# Patient Record
Sex: FEMALE | Race: WHITE
[De-identification: names, ages, dates, MRNs, and addresses within clinical notes are randomized per-mention and may not be internally consistent; named-entity substitution may affect disease eponyms.]

---
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
## 2024-11-29 ENCOUNTER — OFFICE VISIT (OUTPATIENT)
Dept: URGENT CARE | Facility: PHYSICIAN GROUP | Age: 24
End: 2024-11-29
Payer: COMMERCIAL

## 2024-11-29 VITALS
SYSTOLIC BLOOD PRESSURE: 114 MMHG | BODY MASS INDEX: 25.94 KG/M2 | TEMPERATURE: 97.9 F | HEIGHT: 62 IN | OXYGEN SATURATION: 99 % | DIASTOLIC BLOOD PRESSURE: 72 MMHG | RESPIRATION RATE: 18 BRPM | WEIGHT: 140.98 LBS | HEART RATE: 92 BPM

## 2024-11-29 DIAGNOSIS — J01.40 ACUTE NON-RECURRENT PANSINUSITIS: ICD-10-CM

## 2024-11-29 DIAGNOSIS — H10.33 ACUTE CONJUNCTIVITIS OF BOTH EYES, UNSPECIFIED ACUTE CONJUNCTIVITIS TYPE: ICD-10-CM

## 2024-11-29 RX ORDER — MOXIFLOXACIN 5 MG/ML
1 SOLUTION/ DROPS OPHTHALMIC 3 TIMES DAILY
Qty: 2 ML | Refills: 0 | Status: SHIPPED | OUTPATIENT
Start: 2024-11-29 | End: 2024-12-06

## 2024-11-29 RX ORDER — ERYTHROMYCIN 5 MG/G
1 OINTMENT OPHTHALMIC
Qty: 1 G | Refills: 0 | Status: SHIPPED | OUTPATIENT
Start: 2024-11-29 | End: 2024-12-06

## 2024-11-29 RX ORDER — AMOXICILLIN 875 MG/1
875 TABLET, COATED ORAL 2 TIMES DAILY
Qty: 14 TABLET | Refills: 0 | Status: SHIPPED | OUTPATIENT
Start: 2024-11-29 | End: 2024-12-06

## 2024-11-29 ASSESSMENT — ENCOUNTER SYMPTOMS
SWEATS: 0
FEVER: 0
COUGH: 1
VOMITING: 0
CARDIOVASCULAR NEGATIVE: 1
SORE THROAT: 1
HEMOPTYSIS: 0
MYALGIAS: 0
WEIGHT LOSS: 0
SHORTNESS OF BREATH: 0
BACK PAIN: 0
EYE DISCHARGE: 1
EYE REDNESS: 1
NAUSEA: 0
CHILLS: 0
SPUTUM PRODUCTION: 1
WHEEZING: 0
HEADACHES: 1
RHINORRHEA: 1
CONSTITUTIONAL NEGATIVE: 1
HEARTBURN: 0
SINUS PAIN: 1

## 2024-11-29 ASSESSMENT — COPD QUESTIONNAIRES: COPD: 0

## 2024-11-30 NOTE — PROGRESS NOTES
Subjective:   Giorgi Calderon is a 24 y.o. female who presents for Cough (Started 11/9/24 cough ,sore throat ,eye discharge , mucus >brown green , left ear feels pressure )      Patient presents with complaints of persistent cough, sore throat, sinus pain, and new left ear pain.  States that her cough and congestion have been going on for approximately 20 days, thought that she was getting better trying to manage symptoms at home and with watchful waiting though now she has had a recurrence of sore throat congestion new onset of ear pain as well as production to her cough.  Patient states this morning she woke up with bilateral conjunctival discharge, redness swelling.  Denies any fever, chills, body aches no visual changes, no shortness of breath, chest pain no stridor    Cough  This is a new problem. The current episode started 1 to 4 weeks ago. The problem has been waxing and waning. The problem occurs every few minutes. The cough is Productive of sputum. Associated symptoms include ear congestion, eye redness, headaches, nasal congestion, postnasal drip, rhinorrhea and a sore throat. Pertinent negatives include no chest pain, chills, ear pain, fever, heartburn, hemoptysis, myalgias, rash, shortness of breath, sweats, weight loss or wheezing. She has tried nothing for the symptoms. There is no history of asthma, bronchiectasis, bronchitis, COPD, emphysema, environmental allergies or pneumonia.       Review of Systems   Constitutional: Negative.  Negative for chills, fever and weight loss.   HENT:  Positive for congestion, postnasal drip, rhinorrhea, sinus pain and sore throat. Negative for ear discharge and ear pain.    Eyes:  Positive for discharge and redness.   Respiratory:  Positive for cough and sputum production. Negative for hemoptysis, shortness of breath and wheezing.    Cardiovascular: Negative.  Negative for chest pain.   Gastrointestinal:  Negative for heartburn, nausea and vomiting.   Genitourinary:  "Negative.    Musculoskeletal:  Negative for back pain and myalgias.   Skin: Negative.  Negative for rash.   Neurological:  Positive for headaches.   Endo/Heme/Allergies:  Negative for environmental allergies.     Refer to [facility] for additional details.    During this visit, appropriate PPE was worn, and hand hygiene was performed.    PMH:  has no past medical history on file.    MEDS:   Current Outpatient Medications:     norgestrel-ethinyl estradiol (LO-OVRAL) 0.3-30 MG-MCG Tab, Take 1 Tablet by mouth every day., Disp: , Rfl:     amoxicillin (AMOXIL) 875 MG tablet, Take 1 Tablet by mouth 2 times a day for 7 days., Disp: 14 Tablet, Rfl: 0    erythromycin 5 MG/GM Ointment, Apply 1 Application to both eyes at bedtime for 7 days., Disp: 1 g, Rfl: 0    moxifloxacin (VIGAMOX) 0.5 % Solution, Administer 1 Drop into both eyes 3 times a day for 7 days., Disp: 2 mL, Rfl: 0    ALLERGIES: No Known Allergies  SURGHX: History reviewed. No pertinent surgical history.  SOCHX:  reports that she has never smoked. She has never used smokeless tobacco. She reports current alcohol use. She reports that she does not use drugs.    FH: Per HPI as applicable/pertinent.    Medications, Allergies, and current problem list reviewed today in Epic.     Objective:     /72   Pulse 92   Temp 36.6 °C (97.9 °F) (Temporal)   Resp 18   Ht 1.575 m (5' 2\")   Wt 64 kg (140 lb 15.8 oz)   SpO2 99%     Physical Exam  Vitals reviewed.   Constitutional:       General: She is not in acute distress.     Appearance: She is normal weight. She is not ill-appearing.   HENT:      Head: Normocephalic and atraumatic.      Right Ear: Tympanic membrane, ear canal and external ear normal.      Left Ear: Tympanic membrane, ear canal and external ear normal.      Nose: No congestion or rhinorrhea.      Right Sinus: Maxillary sinus tenderness and frontal sinus tenderness present.      Left Sinus: Maxillary sinus tenderness and frontal sinus tenderness present.      " Mouth/Throat:      Mouth: Mucous membranes are moist.      Pharynx: No oropharyngeal exudate or posterior oropharyngeal erythema.   Eyes:      General:         Right eye: Discharge present.         Left eye: Discharge present.     Pupils: Pupils are equal, round, and reactive to light.   Cardiovascular:      Rate and Rhythm: Normal rate.   Pulmonary:      Effort: Pulmonary effort is normal. No respiratory distress.      Breath sounds: No stridor. No wheezing, rhonchi or rales.   Chest:      Chest wall: No tenderness.   Musculoskeletal:      Cervical back: Normal range of motion. No rigidity or tenderness.   Lymphadenopathy:      Cervical: No cervical adenopathy.   Neurological:      Mental Status: She is alert.         Assessment/Plan:     Diagnosis and associated orders:     1. Acute non-recurrent pansinusitis  - amoxicillin (AMOXIL) 875 MG tablet; Take 1 Tablet by mouth 2 times a day for 7 days.  Dispense: 14 Tablet; Refill: 0    2. Acute conjunctivitis of both eyes, unspecified acute conjunctivitis type  - erythromycin 5 MG/GM Ointment; Apply 1 Application to both eyes at bedtime for 7 days.  Dispense: 1 g; Refill: 0  - moxifloxacin (VIGAMOX) 0.5 % Solution; Administer 1 Drop into both eyes 3 times a day for 7 days.  Dispense: 2 mL; Refill: 0    Other orders  - norgestrel-ethinyl estradiol (LO-OVRAL) 0.3-30 MG-MCG Tab; Take 1 Tablet by mouth every day.     Comments/MDM:     Patient history and physical exam are consistent with acute pansinusitis with concomitant new onset of bilateral conjunctival discharge.  High suspicion that patient had likely viral illness that has been persistent lingering causing new superimposed bacterial sinusitis.  Likely reason why patient also now has new onset what sounds like bacterial conjunctivitis.  Overall patient presents well, will treat empirically with amoxicillin for sinusitis infections as well as erythromycin ointment and moxifloxacin drops for conjunctivitis  Outpatient  management will consist of amoxicillin twice daily 7 days, erythromycin ointment at night followed by moxifloxacin during the day 3 times daily, Tylenol/ibuprofen, increase fluids, strict hand hygiene, clean glasses, phone or anything that comes in contact with eye  Follow up in 3-5 days if no improvement in symptoms           Differential diagnosis, natural history, supportive care, and indications for immediate follow-up discussed.    Advised the patient to follow-up with the primary care physician for recheck, reevaluation, and consideration of further management.    Please note that this dictation was created using voice recognition software. I have made a reasonable attempt to correct obvious errors, but I expect that there are errors of grammar and possibly content that I did not discover before finalizing the note.    This note was electronically signed by ALEE Alvarado